# Patient Record
Sex: FEMALE | Race: WHITE | NOT HISPANIC OR LATINO
[De-identification: names, ages, dates, MRNs, and addresses within clinical notes are randomized per-mention and may not be internally consistent; named-entity substitution may affect disease eponyms.]

---

## 2018-07-06 ENCOUNTER — RECORD ABSTRACTING (OUTPATIENT)
Age: 63
End: 2018-07-06

## 2018-07-06 DIAGNOSIS — Z86.19 PERSONAL HISTORY OF OTHER INFECTIOUS AND PARASITIC DISEASES: ICD-10-CM

## 2018-07-06 DIAGNOSIS — B00.9 HERPESVIRAL INFECTION, UNSPECIFIED: ICD-10-CM

## 2018-07-06 DIAGNOSIS — Z86.39 PERSONAL HISTORY OF OTHER ENDOCRINE, NUTRITIONAL AND METABOLIC DISEASE: ICD-10-CM

## 2018-07-06 DIAGNOSIS — R73.03 PREDIABETES.: ICD-10-CM

## 2018-07-20 LAB — BACTERIA UR CULT: ABNORMAL

## 2018-10-12 ENCOUNTER — APPOINTMENT (OUTPATIENT)
Dept: OBGYN | Facility: CLINIC | Age: 63
End: 2018-10-12

## 2020-07-28 ENCOUNTER — APPOINTMENT (OUTPATIENT)
Dept: OBGYN | Facility: CLINIC | Age: 65
End: 2020-07-28
Payer: MEDICARE

## 2020-07-28 VITALS
BODY MASS INDEX: 19.69 KG/M2 | HEIGHT: 62 IN | DIASTOLIC BLOOD PRESSURE: 60 MMHG | WEIGHT: 107 LBS | SYSTOLIC BLOOD PRESSURE: 114 MMHG

## 2020-07-28 DIAGNOSIS — R87.810 CERVICAL HIGH RISK HUMAN PAPILLOMAVIRUS (HPV) DNA TEST POSITIVE: ICD-10-CM

## 2020-07-28 DIAGNOSIS — Z78.9 OTHER SPECIFIED HEALTH STATUS: ICD-10-CM

## 2020-07-28 DIAGNOSIS — Z01.419 ENCOUNTER FOR GYNECOLOGICAL EXAMINATION (GENERAL) (ROUTINE) W/OUT ABNORMAL FINDINGS: ICD-10-CM

## 2020-07-28 PROCEDURE — G0101: CPT

## 2020-07-28 RX ORDER — NITROFURANTOIN (MONOHYDRATE/MACROCRYSTALS) 25; 75 MG/1; MG/1
100 CAPSULE ORAL
Qty: 14 | Refills: 0 | Status: DISCONTINUED | COMMUNITY
Start: 2018-07-17 | End: 2020-07-28

## 2020-07-28 NOTE — PHYSICAL EXAM
[Alert] : alert [Awake] : awake [Soft] : soft [Oriented x3] : oriented to person, place, and time [Normal] : uterus [No Bleeding] : there was no active vaginal bleeding [Uterine Adnexae] : were not tender and not enlarged [Acute Distress] : no acute distress [Mass] : no breast mass [Nipple Discharge] : no nipple discharge [Axillary LAD] : no axillary lymphadenopathy [External Hemorrhoid] : no external hemorrhoids [Tender] : non tender

## 2020-07-28 NOTE — HISTORY OF PRESENT ILLNESS
[Menarche Age: ____] : age at menarche was [unfilled] [Experiencing Menopausal Sxs] : experiencing menopausal symptoms [Menopause Age: ____] : age at menopause was [unfilled] [Sexually Active] : is sexually active

## 2020-07-29 LAB
APPEARANCE: CLEAR
BACTERIA: NEGATIVE
BILIRUBIN URINE: NEGATIVE
BLOOD URINE: NEGATIVE
COLOR: YELLOW
GLUCOSE QUALITATIVE U: NEGATIVE
HPV HIGH+LOW RISK DNA PNL CVX: NOT DETECTED
HYALINE CASTS: 0 /LPF
KETONES URINE: NEGATIVE
LEUKOCYTE ESTERASE URINE: NEGATIVE
MICROSCOPIC-UA: NORMAL
NITRITE URINE: NEGATIVE
PH URINE: 7
PROTEIN URINE: NEGATIVE
RED BLOOD CELLS URINE: 4 /HPF
SPECIFIC GRAVITY URINE: 1.02
SQUAMOUS EPITHELIAL CELLS: 1 /HPF
UROBILINOGEN URINE: NORMAL
WHITE BLOOD CELLS URINE: 0 /HPF

## 2020-07-31 LAB — BACTERIA UR CULT: NORMAL

## 2020-08-03 LAB — CYTOLOGY CVX/VAG DOC THIN PREP: ABNORMAL

## 2020-11-02 ENCOUNTER — APPOINTMENT (OUTPATIENT)
Dept: OBGYN | Facility: CLINIC | Age: 65
End: 2020-11-02
Payer: MEDICARE

## 2020-11-02 VITALS
WEIGHT: 103 LBS | BODY MASS INDEX: 18.95 KG/M2 | SYSTOLIC BLOOD PRESSURE: 120 MMHG | DIASTOLIC BLOOD PRESSURE: 60 MMHG | HEIGHT: 62 IN

## 2020-11-02 DIAGNOSIS — N76.0 ACUTE VAGINITIS: ICD-10-CM

## 2020-11-02 DIAGNOSIS — B96.89 ACUTE VAGINITIS: ICD-10-CM

## 2020-11-02 DIAGNOSIS — N90.89 OTHER SPECIFIED NONINFLAMMATORY DISORDERS OF VULVA AND PERINEUM: ICD-10-CM

## 2020-11-02 PROCEDURE — 99213 OFFICE O/P EST LOW 20 MIN: CPT

## 2020-11-02 NOTE — REVIEW OF SYSTEMS
[Urgency] : urgency [Frequency] : frequency [Incontinence] : no incontinence [Dysuria] : no dysuria [Abn Vaginal bleeding] : no abnormal vaginal bleeding [Sleep Disturbances] : sleep disturbances [Hot Flashes] : hot flashes [Negative] : Heme/Lymph [FreeTextEntry8] : yellow discharge

## 2020-11-02 NOTE — PHYSICAL EXAM
[Appropriately responsive] : appropriately responsive [Alert] : alert [No Acute Distress] : no acute distress [No Lymphadenopathy] : no lymphadenopathy [Regular Rate Rhythm] : regular rate rhythm [No Murmurs] : no murmurs [Clear to Auscultation B/L] : clear to auscultation bilaterally [Soft] : soft [Non-tender] : non-tender [Non-distended] : non-distended [No HSM] : No HSM [No Lesions] : no lesions [No Mass] : no mass [Oriented x3] : oriented x3 [Examination Of The Breasts] : a normal appearance [No Masses] : no breast masses were palpable [Vulvar Atrophy] : vulvar atrophy [Labia Minora] : normal [Discharge] : a  ~M vaginal discharge was present [Moderate] : moderate [Adam] : yellow [Thin] : thin [Normal] : normal [Uterine Adnexae] : normal [FreeTextEntry2] : 3 x 3 mm lesion on left labia majora, erythema and broad based

## 2020-11-02 NOTE — HISTORY OF PRESENT ILLNESS
[FreeTextEntry1] : Yellow discharge with odor and itching x 1 week.  Lesion on left labia that was painful and swollen and now half the size.  No bleeding.  Urgency with dysuria or incontinence.

## 2020-11-02 NOTE — PLAN
[FreeTextEntry1] : Discussed vulvar bx to remove lesion.\par \par Vaginitis, metrogel for bv, affirm and sti cultures done.\par \par OAB, discussed meds and kegels.

## 2020-11-03 LAB
APPEARANCE: ABNORMAL
BACTERIA: NEGATIVE
BILIRUBIN URINE: NEGATIVE
BLOOD URINE: NEGATIVE
CANDIDA VAG CYTO: NOT DETECTED
COLOR: YELLOW
G VAGINALIS+PREV SP MTYP VAG QL MICRO: DETECTED
GLUCOSE QUALITATIVE U: NEGATIVE
HYALINE CASTS: 0 /LPF
KETONES URINE: NEGATIVE
LEUKOCYTE ESTERASE URINE: NEGATIVE
MICROSCOPIC-UA: NORMAL
NITRITE URINE: NEGATIVE
PH URINE: 7.5
PROTEIN URINE: ABNORMAL
RED BLOOD CELLS URINE: 2 /HPF
SPECIFIC GRAVITY URINE: 1.02
SQUAMOUS EPITHELIAL CELLS: 2 /HPF
T VAGINALIS VAG QL WET PREP: NOT DETECTED
UROBILINOGEN URINE: NORMAL
WHITE BLOOD CELLS URINE: 1 /HPF

## 2020-11-04 LAB
BACTERIA UR CULT: NORMAL
C TRACH RRNA SPEC QL NAA+PROBE: NOT DETECTED
N GONORRHOEA RRNA SPEC QL NAA+PROBE: NOT DETECTED
SOURCE AMPLIFICATION: NORMAL
SOURCE AMPLIFICATION: NORMAL
T VAGINALIS RRNA SPEC QL NAA+PROBE: NOT DETECTED

## 2020-11-06 ENCOUNTER — NON-APPOINTMENT (OUTPATIENT)
Age: 65
End: 2020-11-06

## 2020-11-11 ENCOUNTER — NON-APPOINTMENT (OUTPATIENT)
Age: 65
End: 2020-11-11

## 2020-11-12 ENCOUNTER — NON-APPOINTMENT (OUTPATIENT)
Age: 65
End: 2020-11-12

## 2020-11-16 ENCOUNTER — APPOINTMENT (OUTPATIENT)
Dept: OBGYN | Facility: CLINIC | Age: 65
End: 2020-11-16

## 2020-12-23 PROBLEM — N76.0 BV (BACTERIAL VAGINOSIS): Status: RESOLVED | Noted: 2020-11-02 | Resolved: 2020-12-23

## 2021-09-27 ENCOUNTER — APPOINTMENT (OUTPATIENT)
Dept: OBGYN | Facility: CLINIC | Age: 66
End: 2021-09-27
Payer: MEDICARE

## 2021-09-27 VITALS
BODY MASS INDEX: 20.24 KG/M2 | HEIGHT: 62 IN | DIASTOLIC BLOOD PRESSURE: 80 MMHG | SYSTOLIC BLOOD PRESSURE: 120 MMHG | WEIGHT: 110 LBS

## 2021-09-27 DIAGNOSIS — N94.9 UNSPECIFIED CONDITION ASSOCIATED WITH FEMALE GENITAL ORGANS AND MENSTRUAL CYCLE: ICD-10-CM

## 2021-09-27 PROCEDURE — 99213 OFFICE O/P EST LOW 20 MIN: CPT

## 2021-09-27 RX ORDER — TRIAMCINOLONE ACETONIDE 1 MG/G
0.1 CREAM TOPICAL 3 TIMES DAILY
Qty: 1 | Refills: 1 | Status: DISCONTINUED | COMMUNITY
Start: 2020-11-11 | End: 2021-09-27

## 2021-09-27 RX ORDER — NYSTATIN 100000 [USP'U]/G
100000 CREAM TOPICAL TWICE DAILY
Qty: 1 | Refills: 0 | Status: ACTIVE | COMMUNITY
Start: 2021-09-27 | End: 1900-01-01

## 2021-09-27 RX ORDER — METRONIDAZOLE 7.5 MG/G
0.75 GEL VAGINAL
Qty: 1 | Refills: 0 | Status: DISCONTINUED | COMMUNITY
Start: 2020-11-02 | End: 2021-09-27

## 2021-09-27 NOTE — PHYSICAL EXAM
[Appropriately responsive] : appropriately responsive [Alert] : alert [No Acute Distress] : no acute distress [Oriented x3] : oriented x3 [Vulvar Atrophy] : vulvar atrophy [Labia Majora Erythema] : erythema [FreeTextEntry1] : erythema in inguinal folds [FreeTextEntry2] : small lesion noted to left labia majora that tender to touch 5 oclock

## 2021-09-27 NOTE — HISTORY OF PRESENT ILLNESS
[FreeTextEntry1] : 67 yo presents with complaint of external vaginal itching off and on x about 1 year.  She has been using Triamcinolone cream inconsistently without much relief.  Denies vaginal discharge and odor, and urinary incontinence. [PapSmeardate] : 7/28/20 [LMPDate] : 2004

## 2021-10-14 ENCOUNTER — APPOINTMENT (OUTPATIENT)
Dept: GASTROENTEROLOGY | Facility: CLINIC | Age: 66
End: 2021-10-14
Payer: MEDICARE

## 2021-10-14 VITALS
OXYGEN SATURATION: 99 % | HEART RATE: 75 BPM | WEIGHT: 110 LBS | BODY MASS INDEX: 20.24 KG/M2 | TEMPERATURE: 97.5 F | HEIGHT: 62 IN | SYSTOLIC BLOOD PRESSURE: 120 MMHG | DIASTOLIC BLOOD PRESSURE: 80 MMHG

## 2021-10-14 DIAGNOSIS — E78.2 MIXED HYPERLIPIDEMIA: ICD-10-CM

## 2021-10-14 DIAGNOSIS — Z87.42 PERSONAL HISTORY OF OTHER DISEASES OF THE FEMALE GENITAL TRACT: ICD-10-CM

## 2021-10-14 DIAGNOSIS — Z87.440 PERSONAL HISTORY OF URINARY (TRACT) INFECTIONS: ICD-10-CM

## 2021-10-14 DIAGNOSIS — Z86.018 PERSONAL HISTORY OF OTHER BENIGN NEOPLASM: ICD-10-CM

## 2021-10-14 PROCEDURE — 99203 OFFICE O/P NEW LOW 30 MIN: CPT

## 2021-10-14 NOTE — PHYSICAL EXAM
[General Appearance - Alert] : alert [General Appearance - In No Acute Distress] : in no acute distress [General Appearance - Well Nourished] : well nourished [] : no respiratory distress [Respiration, Rhythm And Depth] : normal respiratory rhythm and effort [Auscultation Breath Sounds / Voice Sounds] : lungs were clear to auscultation bilaterally [Apical Impulse] : the apical impulse was normal [Heart Rate And Rhythm] : heart rate was normal and rhythm regular [Heart Sounds] : normal S1 and S2 [Bowel Sounds] : normal bowel sounds [Abdomen Soft] : soft [FreeTextEntry1] : There was mild epigastric tenderness on deep palpation.  With no significant bloating on today's exam.

## 2021-10-14 NOTE — ASSESSMENT
[FreeTextEntry1] : The patient is a 66-year-old female with a history of chronic dyspepsia.  The patient does have epigastric tenderness and may indeed have residual gastritis or early peptic ulcer disease.  Patient does take in significant amounts of caffeine and does vape.  She had a history of H. pylori in the past and I felt it was prudent to check her again with the breath test prior to initiating acid reducing medication as a trial.  If H. pylori is positive we will retreated if negative I will give her a course of a proton pump inhibitor to see if this alleviates the symptoms.  I will be reviewing the patient's previous endoscopy and colonoscopy report done by another physician.  If the symptoms persist my threshold performing a repeat endoscopy is quite low and we will proceed in that direction.  The patient's bloating seems to be on the basis of maldigestion.  It is chronic in nature and worse if she takes fiber.  I told  the patient to start a high potency probiotic and get back to me with a progress report.  She  may benefit from a trial of a medication such as Xifaxan.  I provided the patient with request for sonogram in the event that her symptoms do not alleviate she will repeat the exam to assure that there is no atypical biliary pathology.  Once the database is collected we will decide on further treatment.

## 2021-10-14 NOTE — HISTORY OF PRESENT ILLNESS
[FreeTextEntry1] : I saw your patient Estrella Sotelo in the office today.  The patient is a 66-year-old female who generally enjoys good health.  She denies a history of hypertension diabetes or coronary artery disease and her appetite is fair to good with no dysphagia or unexplained weight loss.  The patient's bowel movements are usually normal with no blood in the stool or on the toilet tissue.  The patient is a longstanding history of dyspeptic symptoms which include epigastric discomfort as well as longstanding abdominal bloating.  The patient apparently had a colonoscopy and endoscopy done by another physician approximately 3 years ago and was told that she had gastritis.  The patient states that she has had H. pylori in 2 separate occasions and she believes that she has been checked for eradication.  The patient notices abdominal bloating on a daily basis and is lactose intolerant.  There is mild abdominal cramping and the bloating is only partially relieved with the passage of flatus.  Patient has several servings of caffeine a day does have wine with dinner and vapes.  The patient does have a family history of colon cancer.  The patient has been vaccinated.  The patient is hesitant to take any medication and currently is not on any acid reducing medication.  Several years ago the patient also had significant right-sided abdominal discomfort which radiated to her back apparently the gallbladder was checked and was normal.

## 2021-10-29 ENCOUNTER — APPOINTMENT (OUTPATIENT)
Dept: GASTROENTEROLOGY | Facility: CLINIC | Age: 66
End: 2021-10-29
Payer: MEDICARE

## 2021-10-29 VITALS
SYSTOLIC BLOOD PRESSURE: 102 MMHG | DIASTOLIC BLOOD PRESSURE: 60 MMHG | HEART RATE: 76 BPM | HEIGHT: 62 IN | WEIGHT: 112.38 LBS | BODY MASS INDEX: 20.68 KG/M2 | OXYGEN SATURATION: 98 %

## 2021-10-29 DIAGNOSIS — R94.5 ABNORMAL RESULTS OF LIVER FUNCTION STUDIES: ICD-10-CM

## 2021-10-29 DIAGNOSIS — N32.81 OVERACTIVE BLADDER: ICD-10-CM

## 2021-10-29 DIAGNOSIS — R92.2 INCONCLUSIVE MAMMOGRAM: ICD-10-CM

## 2021-10-29 PROCEDURE — 99212 OFFICE O/P EST SF 10 MIN: CPT

## 2021-10-29 NOTE — ASSESSMENT
[FreeTextEntry1] : Patient is a 66-year-old female with chronic dyspeptic symptoms.  Was recently checked for H. pylori which was negative and started on a proton pump inhibitor which helps her greatly with her epigastric discomfort.  No red flag signs such as vomiting or unexplained weight loss and at this point we will continue the medication for another 3 to 4 weeks.  If the symptoms persist or recur she will undoubtedly need an upper endoscopy.  The abdominal bloating seems to be on the basis of maldigestion and the patient states that she did have a colonoscopy done less than 5 years ago.  I will attempt to get a copy of the report to see if there was underlying diverticulosis.  The patient states she does feel better she takes digestive enzymes.  If the bloating persists my threshold for performing a CAT scan would be quite low and we will proceed in that direction.  The patient is due for repeat blood work and I added on amylase and lipase as well as inflammatory markers.  Once the database is collected we will decide on further intervention.

## 2021-10-29 NOTE — HISTORY OF PRESENT ILLNESS
[FreeTextEntry1] : I saw patient  Estrella Sotelo in the office today.  The patient was seen last week for dyspeptic symptoms which excluded bloating and gastritis.  Patient was sent for H. pylori testing which was negative and started on a PPI which did help her epigastric discomfort significantly.  There is no nausea vomiting fever chills and the patient relatively normal bowel movements.  The patient notices abdominal bloating but no significant cramping.  The patient is not abusing seen or ethanol.  He does continue to smoke.  The patient states she had a colonoscopy done by another physician less than 5 years ago.  The patient is due for repeat blood testing soon.

## 2021-10-29 NOTE — REVIEW OF SYSTEMS
[As Noted in HPI] : as noted in HPI [Negative] : Respiratory [FreeTextEntry7] : Bloating and dyspepsia.

## 2021-11-29 ENCOUNTER — APPOINTMENT (OUTPATIENT)
Dept: GASTROENTEROLOGY | Facility: CLINIC | Age: 66
End: 2021-11-29
Payer: MEDICARE

## 2021-11-29 VITALS
TEMPERATURE: 97.5 F | OXYGEN SATURATION: 99 % | SYSTOLIC BLOOD PRESSURE: 108 MMHG | BODY MASS INDEX: 19.73 KG/M2 | HEIGHT: 63 IN | HEART RATE: 62 BPM | DIASTOLIC BLOOD PRESSURE: 60 MMHG | WEIGHT: 111.38 LBS

## 2021-11-29 DIAGNOSIS — Z78.9 OTHER SPECIFIED HEALTH STATUS: ICD-10-CM

## 2021-11-29 DIAGNOSIS — Z82.49 FAMILY HISTORY OF ISCHEMIC HEART DISEASE AND OTHER DISEASES OF THE CIRCULATORY SYSTEM: ICD-10-CM

## 2021-11-29 DIAGNOSIS — R10.13 EPIGASTRIC PAIN: ICD-10-CM

## 2021-11-29 DIAGNOSIS — F17.200 NICOTINE DEPENDENCE, UNSPECIFIED, UNCOMPLICATED: ICD-10-CM

## 2021-11-29 DIAGNOSIS — Z80.0 FAMILY HISTORY OF MALIGNANT NEOPLASM OF DIGESTIVE ORGANS: ICD-10-CM

## 2021-11-29 DIAGNOSIS — R14.0 ABDOMINAL DISTENSION (GASEOUS): ICD-10-CM

## 2021-11-29 DIAGNOSIS — Z80.3 FAMILY HISTORY OF MALIGNANT NEOPLASM OF BREAST: ICD-10-CM

## 2021-11-29 PROCEDURE — 99212 OFFICE O/P EST SF 10 MIN: CPT

## 2021-11-29 RX ORDER — OMEPRAZOLE 20 MG/1
20 CAPSULE, DELAYED RELEASE ORAL DAILY
Qty: 30 | Refills: 3 | Status: COMPLETED | COMMUNITY
Start: 2021-10-21 | End: 2022-03-29

## 2021-11-29 NOTE — HISTORY OF PRESENT ILLNESS
[FreeTextEntry1] : I saw your patient Estrella Sotelo in follow-up today.  The patient is a 66-year-old female with no active medical issues who has been followed for dyspepsia.  The patient has been seen several times over the past 2 to 3 months with a complaint of epigastric discomfort with abdominal bloating.  The patient has colonoscopies done by another physician and does have a family history of colon cancer.  The patient's bowel movements were normal but that she did notice some epigastric discomfort with bloating after eating.  There were no red flag signs such as unexplained weight loss severe pain or dysphagia.  The patient was given a proton pump inhibitor with amelioration of her dyspeptic symptoms and epigastric pain and also improvement of her bloating.  The patient is up-to-date on her gynecological exams.  The patient was given a slip to have a sonogram but this was not performed up to the present time.

## 2021-11-29 NOTE — ASSESSMENT
[FreeTextEntry1] : The patient is a 66-year-old female who has responded to proton pump therapy.  She is taking omeprazole 20 mg on a daily basis and notices amelioration of her dyspeptic symptoms and epigastric discomfort.  The patient also states that her bloating has improved.  The patient is hesitant to undergo any invasive testing and I did state as long as her symptoms continue to improve and resolve we will continue conservative medical management.  I will continue to proton pump inhibitor for another 4 to 6 weeks then stop it.  If the symptoms recur she will undoubtedly need an upper endoscopy and she agrees.  The patient was told to go for an abdominal sonogram in any event to be proactive in case her bloating does recur.  The patient has been tested for H. pylori and is negative..

## 2021-11-29 NOTE — PHYSICAL EXAM
[General Appearance - Alert] : alert [General Appearance - In No Acute Distress] : in no acute distress [General Appearance - Well Nourished] : well nourished [General Appearance - Well Developed] : well developed [Respiration, Rhythm And Depth] : normal respiratory rhythm and effort [Exaggerated Use Of Accessory Muscles For Inspiration] : no accessory muscle use [Auscultation Breath Sounds / Voice Sounds] : lungs were clear to auscultation bilaterally [Apical Impulse] : the apical impulse was normal [Heart Rate And Rhythm] : heart rate was normal and rhythm regular [Heart Sounds] : normal S1 and S2 [Bowel Sounds] : normal bowel sounds [Abdomen Soft] : soft [] : no hepato-splenomegaly [FreeTextEntry1] : There was minimal sensitivity in the epigastric area.

## 2022-02-22 ENCOUNTER — APPOINTMENT (OUTPATIENT)
Dept: OBGYN | Facility: CLINIC | Age: 67
End: 2022-02-22
Payer: MEDICARE

## 2022-02-22 VITALS
HEIGHT: 63 IN | SYSTOLIC BLOOD PRESSURE: 124 MMHG | WEIGHT: 113 LBS | DIASTOLIC BLOOD PRESSURE: 76 MMHG | BODY MASS INDEX: 20.02 KG/M2

## 2022-02-22 DIAGNOSIS — R10.2 PELVIC AND PERINEAL PAIN: ICD-10-CM

## 2022-02-22 PROCEDURE — 99213 OFFICE O/P EST LOW 20 MIN: CPT

## 2022-02-22 NOTE — PLAN
[FreeTextEntry1] : Referral for Transvaginal and Transabdominal sonogram given\par Patient was referred for abdominal sonogram by her GI doctor

## 2022-02-22 NOTE — REVIEW OF SYSTEMS
[Night Sweats] : night sweats [Abdominal Pain] : abdominal pain [Pelvic pain] : pelvic pain [Back Pain] : back pain [Negative] : Heme/Lymph

## 2022-02-22 NOTE — HISTORY OF PRESENT ILLNESS
[postmenopausal] : postmenopausal [FreeTextEntry1] : 67 yo presents with complaint of lower pelvic pain that radiates to her lower back.

## 2022-02-25 ENCOUNTER — RESULT REVIEW (OUTPATIENT)
Age: 67
End: 2022-02-25

## 2022-02-25 ENCOUNTER — OUTPATIENT (OUTPATIENT)
Dept: OUTPATIENT SERVICES | Facility: HOSPITAL | Age: 67
LOS: 1 days | End: 2022-02-25
Payer: MEDICARE

## 2022-02-25 ENCOUNTER — APPOINTMENT (OUTPATIENT)
Dept: ULTRASOUND IMAGING | Facility: CLINIC | Age: 67
End: 2022-02-25
Payer: MEDICARE

## 2022-02-25 DIAGNOSIS — Z00.00 ENCOUNTER FOR GENERAL ADULT MEDICAL EXAMINATION WITHOUT ABNORMAL FINDINGS: ICD-10-CM

## 2022-02-25 PROCEDURE — 76856 US EXAM PELVIC COMPLETE: CPT | Mod: 26

## 2022-02-25 PROCEDURE — 76700 US EXAM ABDOM COMPLETE: CPT | Mod: 26

## 2022-02-25 PROCEDURE — 76830 TRANSVAGINAL US NON-OB: CPT

## 2022-02-25 PROCEDURE — 76700 US EXAM ABDOM COMPLETE: CPT

## 2022-02-25 PROCEDURE — 76830 TRANSVAGINAL US NON-OB: CPT | Mod: 26

## 2022-02-25 PROCEDURE — 76856 US EXAM PELVIC COMPLETE: CPT

## 2022-03-01 ENCOUNTER — NON-APPOINTMENT (OUTPATIENT)
Age: 67
End: 2022-03-01

## 2022-03-10 RX ORDER — HYOSCYAMINE SULFATE 0.38 MG/1
0.38 TABLET, EXTENDED RELEASE ORAL
Qty: 30 | Refills: 3 | Status: ACTIVE | COMMUNITY
Start: 2022-03-10 | End: 1900-01-01